# Patient Record
Sex: FEMALE | Race: WHITE | URBAN - METROPOLITAN AREA
[De-identification: names, ages, dates, MRNs, and addresses within clinical notes are randomized per-mention and may not be internally consistent; named-entity substitution may affect disease eponyms.]

---

## 2019-10-03 ENCOUNTER — OFFICE VISIT (OUTPATIENT)
Dept: INTERNAL MEDICINE | Facility: CLINIC | Age: 19
End: 2019-10-03
Payer: COMMERCIAL

## 2019-10-03 VITALS
HEART RATE: 78 BPM | DIASTOLIC BLOOD PRESSURE: 81 MMHG | OXYGEN SATURATION: 100 % | SYSTOLIC BLOOD PRESSURE: 117 MMHG | WEIGHT: 150.3 LBS

## 2019-10-03 DIAGNOSIS — Z00.00 ROUTINE HISTORY AND PHYSICAL EXAMINATION OF ADULT: Primary | ICD-10-CM

## 2019-10-03 DIAGNOSIS — F43.21 ADJUSTMENT DISORDER WITH DEPRESSED MOOD: ICD-10-CM

## 2019-10-03 RX ORDER — DROSPIRENONE AND ETHINYL ESTRADIOL 0.03MG-3MG
1 KIT ORAL DAILY
COMMUNITY
End: 2020-05-04

## 2019-10-03 SDOH — HEALTH STABILITY: PHYSICAL HEALTH: ON AVERAGE, HOW MANY DAYS PER WEEK DO YOU ENGAGE IN MODERATE TO STRENUOUS EXERCISE (LIKE A BRISK WALK)?: 7 DAYS

## 2019-10-03 SDOH — HEALTH STABILITY: MENTAL HEALTH: HOW OFTEN DO YOU HAVE A DRINK CONTAINING ALCOHOL?: 2-4 TIMES A MONTH

## 2019-10-03 SDOH — HEALTH STABILITY: MENTAL HEALTH: HOW OFTEN DO YOU HAVE 6 OR MORE DRINKS ON ONE OCCASION?: LESS THAN MONTHLY

## 2019-10-03 SDOH — HEALTH STABILITY: PHYSICAL HEALTH: ON AVERAGE, HOW MANY MINUTES DO YOU ENGAGE IN EXERCISE AT THIS LEVEL?: 60 MIN

## 2019-10-03 ASSESSMENT — ENCOUNTER SYMPTOMS
CHILLS: 0
DIARRHEA: 0
TINGLING: 0
DYSURIA: 0
CONSTIPATION: 0
VOMITING: 0
DECREASED CONCENTRATION: 1
NAUSEA: 0
WEAKNESS: 0
ABDOMINAL PAIN: 0
DEPRESSION: 1
NERVOUS/ANXIOUS: 1
SHORTNESS OF BREATH: 0
ARTHRALGIAS: 0
FEVER: 0

## 2019-10-03 ASSESSMENT — ANXIETY QUESTIONNAIRES
5. BEING SO RESTLESS THAT IT IS HARD TO SIT STILL: NOT AT ALL
3. WORRYING TOO MUCH ABOUT DIFFERENT THINGS: MORE THAN HALF THE DAYS
6. BECOMING EASILY ANNOYED OR IRRITABLE: NEARLY EVERY DAY
7. FEELING AFRAID AS IF SOMETHING AWFUL MIGHT HAPPEN: SEVERAL DAYS
GAD7 TOTAL SCORE: 9
1. FEELING NERVOUS, ANXIOUS, OR ON EDGE: SEVERAL DAYS
2. NOT BEING ABLE TO STOP OR CONTROL WORRYING: MORE THAN HALF THE DAYS
IF YOU CHECKED OFF ANY PROBLEMS ON THIS QUESTIONNAIRE, HOW DIFFICULT HAVE THESE PROBLEMS MADE IT FOR YOU TO DO YOUR WORK, TAKE CARE OF THINGS AT HOME, OR GET ALONG WITH OTHER PEOPLE: SOMEWHAT DIFFICULT

## 2019-10-03 ASSESSMENT — PATIENT HEALTH QUESTIONNAIRE - PHQ9
SUM OF ALL RESPONSES TO PHQ QUESTIONS 1-9: 11
5. POOR APPETITE OR OVEREATING: NOT AT ALL

## 2019-10-03 ASSESSMENT — PAIN SCALES - GENERAL: PAINLEVEL: NO PAIN (0)

## 2019-10-03 NOTE — PROGRESS NOTES
PRIMARY CARE CENTER         HPI:       Rivka Graham is a 19 year old female who presents to [facility] care     Moved here 10 weeks ago and has experienced a lot of change. Since moving she has noticed that she has experienced decreased motivation and depressed mood. She describes not wanting to leave the house, and not wanting to go to work. She cries frequently and has gained 10 lbs since move. At this point her mood is affecting her relationship. She exercises for 60 minutes daily and bikes to work daily. She lives in a townhouse with her boyfriend.     LMP 9/29. She is currently on birth control. Has never had a PAP. [facility] and has not had a new partner since.       Problem, Medication and Allergy Lists were   reviewed and are current.     Patient Active Problem List    Diagnosis Date Noted     Adjustment disorder with depressed mood 10/03/2019     Priority: Medium         Current Outpatient Medications   Medication Sig Dispense Refill     drospirenone-ethinyl estradiol (HAYLEE 28) 3-0.03 MG tablet Take 1 tablet by mouth daily           Allergies   Allergen Reactions     Adhesive Tape      Pt reports rash if she wears a band aid for too long      Patient is a new patient to this clinic and so  I reviewed/updated the Past Medical History, the Family History and the Social History.   Past Medical History:   Diagnosis Date     No pertinent past medical history    ,   Family History     Problem (# of Occurrences) Relation (Name,Age of Onset)    Bipolar Disorder (1) Mother    Breast Cancer (1) Paternal Grandmother    Cerebrovascular Disease (1) Paternal Grandfather    Depression (1) Father    Hypertension (1) Paternal Grandfather    Schizophrenia (1) Mother       and   Social History     Socioeconomic History     Marital status: Single     Spouse name: None     Number of children: None     Years of education: None     Highest education level: None    Occupational History     Occupation: Student     Employer: [employer]     Comment: PhD Environmental Health   Social Needs     Financial resource strain: None     Food insecurity:     Worry: None     Inability: None     Transportation needs:     Medical: None     Non-medical: None   Tobacco Use     Smoking status: Never Smoker     Smokeless tobacco: Never Used   Substance and Sexual Activity     Alcohol use: Yes     Frequency: 2-4 times a month     Binge frequency: Less than monthly     Drug use: Never     Sexual activity: Yes     Partners: Male     Birth control/protection: Condom, Pill   Lifestyle     Physical activity:     Days per week: 7 days     Minutes per session: 60 min     Stress: None   Relationships     Social connections:     Talks on phone: None     Gets together: None     Attends Sabianist service: None     Active member of club or organization: None     Attends meetings of clubs or organizations: None     Relationship status: None     Intimate partner violence:     Fear of current or ex partner: None     Emotionally abused: None     Physically abused: None     Forced sexual activity: None   Other Topics Concern     None   Social History Narrative    Moved from [facility]. 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 Moved to [place] August 2019            Review of Systems:     Review of Systems     Constitutional:  Negative for fever and chills.   Respiratory:   Negative for shortness of breath.    Cardiovascular:  Negative for chest pain.   Gastrointestinal:  Negative for nausea, vomiting, abdominal pain, diarrhea and constipation.   Genitourinary:  Negative for dysuria.   Musculoskeletal:  Negative for arthralgias.   Skin:  Negative for rash.   Neurological:  Negative for tingling and weakness.   Psychiatric/Behavioral:  Positive for depression, decreased concentration and mood swings.      I have personally reviewed and updated the complete ROS on the day of the visit.            Physical Exam:   /81 (BP Location: Right arm, Patient Position: Sitting, Cuff Size: Adult Regular)   Pulse 78   Wt 68.2 kg (150 lb 4.8 oz)   SpO2 100%   There is no height or weight on file to calculate BMI.  Vitals were reviewed       GENERAL APPEARANCE: healthy, alert a     EYES: EOM     HENT: ear canals and TM's normal and nose and mouth without ulcers or lesions     RESP: lungs clear to auscultation - no rales, rhonchi or wheezes     CV: regular rates and rhythm, normal S1 S2, no S3 or S4 and no murmur, click or rub     ABDOMEN:  Soft, no surgical scars, nontender, no HSM or masses and bowel sounds normal     MS: extremities normal- no gross deformities noted, no evidence of inflammation in joints, FROM in all extremities.     SKIN: no suspicious lesions or rashes on exposed skin     NEURO: Normal strength and tone, sensory exam grossly normal, mentation intact and speech normal     PSYCH:mentation normal, intermittently tearful throughout exam      Results:   PHQ 9- score 11  Little interest +2  Feeling down depressed or hopeless +2  Trouble falling asleep or sleeping too much +1  Feeling tired or having little energy test to  Poor appetite or overeating +1  Or feeling bad about yourself or that you are a failure +2  Trouble concentrating on things +1  Moving slowly +0  That said she would be better off dead or hurting herself +0      PRITI 7- score 9  Is feeling nervous +1  Not being able to control worrying plus you  Wearing too much about different things positive  Trouble relaxing +0  Being so restless that it is hard to sit still +0  Becoming easily annoyed or irritable +3  Healing afraid that something awful might happen +1    Assessment and Plan     Rivka was seen today for establish care.    Diagnoses and all orders for this visit:    Routine history and physical examination of adult  Patient presented today to establish care. STI screening completed within last year. Influenza shot received  last week. HPV vaccination series completed per patient report (unable to access immunization records in [place]). Due for PAP smear. Reviewed procedure for PAP with patient. She will make a follow up for a PAP smear.     Adjustment disorder with depressed mood  For the last 10 weeks she endorses mood changes. PHQ 9 and PRITI 7 consistent with moderate depression and mild anxiety. Given time course would not yet classify patient as having major depression disorder, as this may just be an adjustment disorder with mixed depression and anxiety, but primarily depression. Discussed therapeutic options with patient. At this time discussed counseling services and medication. Patient would like to start with counseling, which is reasonable. Reviewed [facility] student services and printed numbers to counseling services and walk in services. Discussed possibility of referring within [facility] if unsuccessful using campus resources. Additionally, discussed starting lexapro if unsuccessful in counseling. Will check in on patient in 1-2 weeks to assess progress with counseling and symptom management.       Options for treatment and follow-up care were reviewed with the patient. Rivka Graham engaged in the decision making process and verbalized understanding of the options discussed and agreed with the final plan.    Joana Chavez MD  Internal Medicine, PGY2  [id number]  Oct 3, 2019    Pt was seen and plan of care discussed with Dr. Crews.     Pt was seen and examined with Dr. Chavez.  I agree with her documentation as noted above.    My additional comments: None    Mitchell Crews MD, MD

## 2019-10-03 NOTE — PATIENT INSTRUCTIONS
[facility] Medication Refill Request Information:  * Please contact your pharmacy regarding ANY request for medication refills.  ** [facility] Prescription Fax = [phone]  * Please allow 3 business days for routine medication refills.  * Please allow 5 business days for controlled substance medication refills.     [facility] Test Result notification information:  *You will be notified with in 7-10 days of your appointment day regarding the results of your test.  If you are on MyChart you will be notified as soon as the provider has reviewed the results and signed off on them.    [facility] Care Center: [phone]          [facility]         Internal Medicine Resident                   Continuity Clinic    Who We Are    Resident Continuity Clinic is a part of the [facility] Primary Care Clinic.  Resident physicians see patients independently and establish a relationship with them over the course of their three-year residency program.  As with the Primary Care Clinic, our Resident Continuity Clinic models a group practice.  If your doctor is not available, you will be able to see another resident physician.  At the end of a resident s training, patients will be transitioned to a new resident physician for ongoing care.     We treat patients with a wide array of medical needs from routine physicals, to acute illnesses, to diabetes and blood pressure management, to complex medical illness.  What is a Resident Physician?    Resident physicians hold medical degrees and are doctors. They are training to become specialists in Internal Medicine. They work under the supervision of board-certified faculty physicians.  Expectations for Your Care    We strive to provide accessible, quality care at all times.    In order to provide this care, it is best to see your primary care resident doctor consistently rather switching between providers.  In the event you do see another physician, you should schedule  a follow-up visit with your usual primary care doctor.    If you are transitioning your care from another clinic, it is helpful to have your records available for your doctor to review.    We do not prescribe controlled substances, such as ADD medications or narcotic pain medications, on your first visit.  We will review your health records and concerns prior to devising a treatment plan with you in order to provide the best care.      Clinic Services     Extended clinic hours; patient  to help navigate your visit;  parking; laboratory and imaging services with evening and weekend hours    Multiple medical and surgical specialties in one building    Complementary services, including Nutrition, Integrative Medicine, Pharmacy consultations, Mental and Behavioral Health, Sports Medicine and Physical Therapy    Thank You    We would like to thank you for choosing the [facility] Internal Medicine Resident Continuity Clinic for your primary care. You are making a priceless contribution to the training of the next generation of health care practitioners.     Contact us at [phone] for appointments or questions.    Resident Clinic Hours are Tuesdays and Thursdays, 7:30am-5:00pm    Residents   Farooq Solano MD  (Male)   Barry Tran MD   (Male)   Kelsi Carlos MD  (Female)  Eboni Webb MD   (Female)   Polly Johnson MD   (Female)    Aimee Chaves MD    (Female)   Nickolas Baiely MD  (Male)   Linwood Sparks MD  (Male)    Caryn Weldon MD  (Female)   Obdulia Sandoval MD  (Female)   Joana Chavez MD    (Female)   Kvng Ramires MD  (Male)   Kvng Duarte MD  (Male)   Parish Vallejo MD  (Male)   PATRIZIA Ramirez MD   (Male)   Mario Hill MD  (Male)    Nieves Olsen MD (Female)   Ishan Hathaway MD  (Male)   Wanda Levin MD  (Male)   Jovany Murillo MD  (Male)   Ethel Dominguez MD    (Female)   Santa Reyes MD  (Female)     Supervising Physicians   MD Edward Warner,  MD Ronit Chatman, MD Jean Day, MD Lane Cespedes, MD Shirley Jo, MD Talia Jones, MD Conrado Aviles, MD Shira Razo MD

## 2019-10-03 NOTE — NURSING NOTE
Chief Complaint   Patient presents with     Establish Care     Pt is here to establish care, recently moved from [facility] but has records with [facility] in [place].      Nitza Do, EMT at 7:15 AM sign on 10/3/2019

## 2019-10-04 ASSESSMENT — ANXIETY QUESTIONNAIRES: GAD7 TOTAL SCORE: 9

## 2019-10-28 ENCOUNTER — TELEPHONE (OUTPATIENT)
Dept: INTERNAL MEDICINE | Facility: CLINIC | Age: 19
End: 2019-10-28

## 2019-10-28 NOTE — TELEPHONE ENCOUNTER
Responded to patient via [employer] per patients request. Cristin Campos LPN 10/28/2019 2:16 PM

## 2019-10-28 NOTE — TELEPHONE ENCOUNTER
Health Call Center    Phone Message    May a detailed message be left on voicemail: yes    Reason for Call: Medication Question or concern regarding medication   Prescription Clarification  Name of Medication: Anti-depressent  Prescribing Provider:    Pharmacy:    What on the order needs clarification? Patient called because she has questions in regards to what antidepressants Dr Chavez would be looking at prescribing and would like to be able to do additional research on them. She stated she can be reached through [employer].          Action Taken: Message routed to:  [facility]

## 2019-11-20 ENCOUNTER — TELEPHONE (OUTPATIENT)
Dept: INTERNAL MEDICINE | Facility: CLINIC | Age: 19
End: 2019-11-20

## 2019-11-20 DIAGNOSIS — F43.21 ADJUSTMENT DISORDER WITH DEPRESSED MOOD: Primary | ICD-10-CM

## 2019-11-20 RX ORDER — ESCITALOPRAM OXALATE 10 MG/1
10 TABLET ORAL DAILY
Qty: 30 TABLET | Refills: 11 | Status: SHIPPED | OUTPATIENT
Start: 2019-11-20

## 2019-11-20 NOTE — TELEPHONE ENCOUNTER
Attempted to call patient 11/20/19. Left brief voicemail and responded to previous concerns via [facility] [employer]

## 2020-03-11 ENCOUNTER — HEALTH MAINTENANCE LETTER (OUTPATIENT)
Age: 20
End: 2020-03-11

## 2020-05-04 DIAGNOSIS — Z78.9 USES BIRTH CONTROL: Primary | ICD-10-CM

## 2020-05-04 NOTE — TELEPHONE ENCOUNTER
drospirenone-ethinyl estradiol (HAYLEE 28) 3-0.03 MG tablet HISTORICAL ONLY ON MEDICATION LIST       Last Office Visit : 10-3-19  Future Office visit:  NONE    Routing refill request to provider for review/approval because:  Historical entry.      Kathleen M Doege RN

## 2020-05-04 NOTE — TELEPHONE ENCOUNTER
[facility] Call Center    Phone Message    May a detailed message be left on voicemail: yes     Reason for Call: Medication Refill Request    Has the patient contacted the pharmacy for the refill? Yes   Name of medication being requested: drospirenone-ethinyl estradiol (HAYLEE 28) 3-0.03 MG tablet  Provider who prescribed the medication: Dr. Joana Armenta  Pharmacy: [address]  Date medication is needed: Saturday, 5/09/20         Action Taken: Message routed to:  [facility] MED Refill Team for  [facility]    Travel Screening: Not Applicable

## 2020-05-18 RX ORDER — DROSPIRENONE AND ETHINYL ESTRADIOL 0.03MG-3MG
1 KIT ORAL DAILY
Qty: 84 TABLET | Refills: 2 | Status: SHIPPED | OUTPATIENT
Start: 2020-05-18 | End: 2021-02-09

## 2021-01-03 ENCOUNTER — HEALTH MAINTENANCE LETTER (OUTPATIENT)
Age: 21
End: 2021-01-03

## 2021-02-08 DIAGNOSIS — Z78.9 USES BIRTH CONTROL: ICD-10-CM

## 2021-02-09 ENCOUNTER — TELEPHONE (OUTPATIENT)
Dept: INTERNAL MEDICINE | Facility: CLINIC | Age: 21
End: 2021-02-09

## 2021-02-09 RX ORDER — DROSPIRENONE AND ETHINYL ESTRADIOL 0.03MG-3MG
1 KIT ORAL DAILY
Qty: 84 TABLET | Refills: 0 | Status: SHIPPED | OUTPATIENT
Start: 2021-02-09 | End: 2021-05-06

## 2021-02-09 NOTE — TELEPHONE ENCOUNTER
Health Call Center    Phone Message    May a detailed message be left on voicemail: yes     Reason for Call: Medication Question or concern regarding medication   Prescription Clarification  Name of Medication: drospirenone-ethinyl estradiol (HAYLEE 28) 3-0.03 MG tablet   Prescribing Provider: Mitchell Crews   Pharmacy: [id number] [address] AT [facility]   What on the order needs clarification? Patient is requesting for a refill for this medication until she gets her insurance plan changed. Patient states she is going to establish care elsewhere, but would like a refill. Patient was notified that she needs an appointment since her oast visit was 10/03/2019. Please call patient back to confirm or deny the birth control.      Action Taken: Message routed to:  [facility]    Travel Screening: Not Applicable

## 2021-02-09 NOTE — TELEPHONE ENCOUNTER
----- Message from Candice Avila RN sent at 2/9/2021  8:49 AM CST -----  Regarding: appt due  annual- last saw Dr Chavez  Please call to schedule.Annual     Thanks    I do not need any follow up on the scheduling of this appointment unless the patient will no longer be receiving care in our clinic.

## 2021-02-09 NOTE — TELEPHONE ENCOUNTER
Left message with Primary Care clinic number requesting patient to call back to schedule re-establish care/annual appointment. Last visit Dr Chavez 10/03/2019, provider graduated.

## 2021-02-09 NOTE — TELEPHONE ENCOUNTER
Last appt [facility] 10/3/2019. Scheduling has been notified to contact the pt for appointment.   90 day refill x 1

## 2021-04-15 ENCOUNTER — NURSE TRIAGE (OUTPATIENT)
Dept: NURSING | Facility: CLINIC | Age: 21
End: 2021-04-15

## 2021-04-15 NOTE — TELEPHONE ENCOUNTER
Got IUD yesterday and intense cramps since then.  Taking ibuprofen for it.    Heating pad.    Warm bath.  Try alternating tylenol with the ibuprofen.    She is almost ready to throw in the towel and get it pulled out. intense cramping.    Try today and if not better by tomorrow then needs to be seen Friday.    Caller agrees with care advice given. Agreed to call back if patient has additional symptoms or questions.    Gail Dunbar RN  [facility] Nurse Advisor    Additional Information    Negative: Shock suspected (e.g., cold/pale/clammy skin, too weak to stand, low BP, rapid pulse)    Negative: Sounds like a life-threatening emergency to the triager    Negative: Abdominal pain and pregnant < 20 weeks    Negative: Vaginal bleeding and pregnant < 20 weeks    Negative: Vaginal discharge is main symptom    Negative: SEVERE abdominal pain (e.g., excruciating) and present > 1 hour    Negative: SEVERE vaginal bleeding (i.e., soaking 2 pads or tampons per hour and present 2 or more hours)    Negative: MODERATE vaginal bleeding (i.e., soaking 1 pad or tampon per hour and present > 6 hours)    Negative: Constant abdominal pain and present > 2 hours    Negative: Patient sounds very sick or weak to the triager    Negative: Caller has URGENT question and triager unable to answer question    Negative: MILD abdominal pain (e.g., does not interfere with normal activities) that comes and goes (cramps) and present > 48 hours    Negative: Pregnant    Negative: Periods with > 6 soaked pads or tampons per day and lasts > 7 days    Negative: Worried that IUD is not in right place (e.g., not able to feel the IUD string, string longer than usual, can feel hard plastic of IUD)    Negative: IUD came out (e.g., has IUD in her hand)    Negative: Bad smelling vaginal discharge    Negative: Abnormal color vaginal discharge (i.e., yellow, green, gray)    Negative: Patient wants to be seen    IUD, questions about    Negative: Periods  with > 6 soaked pads or tampons per day    Negative: Periods last > 7 days    Negative: Periods are WORSE (more bleeding or pain) since IUD was placed and more than 3 months (3 menstrual cycles) since IUD was placed    Negative: Bleeding or spotting between periods since IUD was placed and more than 3 months (3 menstrual cycles) since IUD was placed    Negative: Has a 3-year hormonal IUD (e.g., Jaydess, Liletta, Sklya) and more than 3 years since insertion    Negative: Has 5-year hormonal IUD (e.g., Kyleena, LNG-IUS, Mirena, Riana) and more than 5 years since insertion    Negative: Has copper IUD (e.g., ParaGard) and more than 10 years since insertion    Negative: Wants to get IUD removed    Protocols used: CONTRACEPTION - IUD SYMPTOMS AND [id number]

## 2021-04-17 ENCOUNTER — NURSE TRIAGE (OUTPATIENT)
Dept: NURSING | Facility: CLINIC | Age: 21
End: 2021-04-17

## 2021-04-18 ENCOUNTER — NURSE TRIAGE (OUTPATIENT)
Dept: NURSING | Facility: CLINIC | Age: 21
End: 2021-04-18

## 2021-04-18 NOTE — TELEPHONE ENCOUNTER
"    Reason for Disposition    SEVERE vaginal bleeding (i.e., soaking 2 pads or tampons per hour and present 2 or more hours)    Additional Information    Negative: [1] SEVERE abdominal pain (e.g., excruciating) AND [2] present > 1 hour    Negative: [1] Abdominal pain AND [2] pregnant < 20 weeks    Negative: [1] Vaginal bleeding AND [2] pregnant < 20 weeks    Negative: Vaginal discharge is main symptom    Protocols used: CONTRACEPTION - IUD SYMPTOMS AND [id number]    \"I had an IUD inserted on Wednesday 4/14. I was having so much pain , that I had it removed at [facility] on Friday 4/16. I called the nurse line yesterday (see epic) because I was having some cramping and bleeding.  Today I am still bleeding, a few small clots(pea size) and I am bleeding through a tampon every hour.\"  Denies other sx  Triaged and advised to be seen. Patient prefers to monitor a little longer, but agrees to go in if bleeding worsens  Glory Dela Cruz RN [facility]      "

## 2021-05-04 DIAGNOSIS — Z78.9 USES BIRTH CONTROL: ICD-10-CM

## 2021-05-06 RX ORDER — DROSPIRENONE AND ETHINYL ESTRADIOL 0.03MG-3MG
1 KIT ORAL DAILY
Qty: 28 TABLET | Refills: 0 | Status: SHIPPED | OUTPATIENT
Start: 2021-05-06

## 2021-05-06 NOTE — TELEPHONE ENCOUNTER
drospirenone-ethinyl estradiol (HAYLEE 28) 3-0.03 MG tablet   Last Written Prescription Date:  2/9/2021  Last Fill Quantity: 90,   # refills: 0  Last Office Visit : 10/3/2019  Future Office visit:  None       30 day yenifer refill sent to the pharmacy - including instructions for patient to call the clinic and schedule an appointment.

## 2021-10-10 ENCOUNTER — HEALTH MAINTENANCE LETTER (OUTPATIENT)
Age: 21
End: 2021-10-10

## 2022-01-30 ENCOUNTER — HEALTH MAINTENANCE LETTER (OUTPATIENT)
Age: 22
End: 2022-01-30

## 2022-09-18 ENCOUNTER — HEALTH MAINTENANCE LETTER (OUTPATIENT)
Age: 22
End: 2022-09-18

## 2023-05-07 ENCOUNTER — HEALTH MAINTENANCE LETTER (OUTPATIENT)
Age: 23
End: 2023-05-07

## 2023-05-19 NOTE — TELEPHONE ENCOUNTER
Pt is calling.    Katia IUD placed on Wednesday. Check on Friday due to cramping and pain. IUD was removed at that time.   No cramping since. Some spotting.  Now today, she has increase bleeding like the first day of her menstrual cycle.  Denies fever or any pain/cramping now.  LMP 04/06/2021.  Care advice reviewed. I advised her that it is most likely due to removing the IUD as it did have hormones and those have stopped now.  When to call back on care advice reviewed. May still have another menses when due as well, and that would be normal too.   Call back with any further concerns or questions. She verbalized understanding.    Additional Information    Negative: Shock suspected (e.g., cold/pale/clammy skin, too weak to stand, low BP, rapid pulse)    Negative: Sounds like a life-threatening emergency to the triager    Negative: [1] Abdominal pain AND [2] pregnant < 20 weeks    Negative: [1] Vaginal bleeding AND [2] pregnant < 20 weeks    Negative: Vaginal discharge is main symptom    Negative: [1] SEVERE abdominal pain (e.g., excruciating) AND [2] present > 1 hour    Negative: SEVERE vaginal bleeding (i.e., soaking 2 pads or tampons per hour and present 2 or more hours)    Negative: Patient sounds very sick or weak to the triager    Negative: MODERATE vaginal bleeding (i.e., soaking 1 pad or tampon per hour and present > 6 hours)    Negative: [1] Constant abdominal pain AND [2] present > 2 hours    Negative: [1] Caller has URGENT question AND [2] triager unable to answer question    Negative: [1] MILD abdominal pain (e.g., does not interfere with normal activities) AND [2] pain comes and goes (cramps) AND [3] present > 48 hours    Negative: [1] Caller has NON-URGENT question AND [2] triager unable to answer question    Negative: Pregnant    Negative: [1] Periods with > 6 soaked pads or tampons per day AND [2] last > 7 days    Negative: Worried that IUD is not in right place (e.g., not able to feel the IUD  string, string longer than usual, can feel hard plastic of IUD)    Negative: IUD came out (e.g., has IUD in her hand)    Negative: Bad smelling vaginal discharge    Negative: Abnormal color vaginal discharge (i.e., yellow, green, gray)    Negative: Periods with > 6 soaked pads or tampons per day    Negative: Periods last > 7 days    Negative: [1] Periods are WORSE (more bleeding or pain) since IUD was placed AND [2] more than 3 months (3 menstrual cycles) since IUD was placed    Negative: [1] Bleeding or spotting between periods since IUD was placed AND [2]  more than 3 months (3 menstrual cycles) since IUD was placed    Negative: [1] Has a 3-year hormonal IUD (e.g., Jaydess, Liletta, Sklya) AND [2] more than 3 years since insertion    Negative: [1] Has 5-year hormonal IUD (e.g., Kyleena, LNG-IUS, Mirena, Riana) AND [2] more than 5 years since insertion    Negative: [1] Has copper IUD (e.g., ParaGard) AND [2] more than 10 years since insertion    Negative: Wants to get IUD removed    Hormonal IUD (e.g., Jaydess, Kyleena, Liletta, LNG-IUS, Mirena, Riana), questions about    Negative: [1] Periods are WORSE (more bleeding or pain) since IUD was placed AND [2]  3 or less months (3 menstrual cycles) since IUD was placed    Negative: [1] Bleeding or spotting between periods since IUD was placed AND [2]  3 or less months (3 menstrual cycles) since IUD was placed    Negative: Menstrual period, missed or late    Negative: Shock suspected (e.g., cold/pale/clammy skin, too weak to stand, low BP, rapid pulse)    Negative: Difficult to awaken or acting confused (e.g., disoriented, slurred speech)    Negative: Passed out (i.e., lost consciousness, collapsed and was not responding)    Negative: Sounds like a life-threatening emergency to the triager    Negative: Followed a genital area injury    Negative: Pregnant > 20 weeks  (5 months or more)    Negative: Pregnant < 20 weeks  (less than 5 months)    Negative: Postpartum (from 0  to 6 weeks after delivery)    Negative: Bleeding occurring > 12 months after menopause    Negative: Bleeding from sexual abuse or rape    Negative: [1] Vaginal discharge is main symptom AND [2] small amount of blood    Negative: SEVERE abdominal pain    Negative: SEVERE dizziness (e.g., unable to stand, requires support to walk, feels like passing out now)    Negative: SEVERE vaginal bleeding (i.e., soaking 2 pads or tampons per hour and present 2 or more hours; 1 menstrual cup every 2 hours)    Negative: Patient sounds very sick or weak to the triager    Negative: MODERATE vaginal bleeding (i.e., soaking 1 pad or tampon per hour and present > 6 hours; 1 menstrual cup every 6 hours)    Negative: [1] Constant abdominal pain AND [2] present > 2 hours    Negative: Pale skin (pallor) of new onset or worsening    Negative: Passed tissue (e.g., gray-white)    Negative: Taking Coumadin (warfarin) or other strong blood thinner, or known bleeding disorder (e.g., thrombocytopenia)    Negative: [1] Skin bruises or nosebleed AND [2] not caused by an injury    Negative: [1] Periods with > 6 soaked pads or tampons per day AND [2] last > 7 days    Negative: [1] Bleeding or spotting after procedure (e.g., biopsy) or pelvic examination (e.g., pap smear) AND [2] lasts > 7 days    Negative: Periods with > 6 soaked pads or tampons per day    Negative: Periods last > 7 days    Negative: [1] Uses menstrual cups AND [2] more than 80 ml blood per menstrual period.    Negative: [1] Missed period AND [2] has occurred 2 or more times in the last year    Negative: [1] Menstrual cycle < 21 days OR > 35 days AND [2] occurs more than two cycles (2 months) this past year    Negative: [1] Bleeding or spotting between regular periods AND [2] occurs more than three cycles (3 months) this past year    Negative: [1] Bleeding or spotting between regular periods AND [2] occurs more than three cycles (3 months) AND [3] using birth control medicine (pills,  patch, Depo-Provera, Implanon, vaginal ring, Mirena IUD)    Negative: Bleeding or spotting occurs after hysterectomy    Negative: Bleeding or spotting occurs after sex (Exception: first intercourse)    Negative: Normal menstrual flow    [1] Menstrual cycle < 21 days OR > 35 days AND [2] has occurred once this past year    Protocols used: CONTRACEPTION - IUD SYMPTOMS AND [id number], VAGINAL BLEEDING - [id number]    Shannan Lopez RN  [facility] Nurse Advisor  4/17/2021 at 10:29 PM       Home